# Patient Record
Sex: MALE | Race: WHITE | NOT HISPANIC OR LATINO | Employment: OTHER | URBAN - METROPOLITAN AREA
[De-identification: names, ages, dates, MRNs, and addresses within clinical notes are randomized per-mention and may not be internally consistent; named-entity substitution may affect disease eponyms.]

---

## 2019-07-09 ENCOUNTER — TELEPHONE (OUTPATIENT)
Dept: PSYCHIATRY | Facility: CLINIC | Age: 54
End: 2019-07-09

## 2019-07-09 NOTE — TELEPHONE ENCOUNTER
PSYCHIATRY CLINIC PHONE INTAKE     SERVICES REQUESTED / INTERESTED IN          Individual Psychotherapy     Presenting Problem and Brief History                              What would you like to be seen for? (brief description):  Patient has self referred for Substance Abuse Evaluation, with the potentiality for continued care. Patient has never been in a treatment setting either inpatient or outpatient. Patients DOC is alcohol, and patient has been using for 30 plus years. Patient noticed an effect on his day to day life 10 years past. Patient stated that he is a functioning alcoholic. Patient stated that he will drink beer, wine, and liquor. Patient stated he has never been to detox. Patient stated he has never attended AA. Patient stated that his wife moved out due to patients drinking, and he is attempting to become sober to regain his relationship. Patient is seeing a psychologist at [facility] for the past two months. Patient stated he is now taking Trazadone, and a sleep medication before bed, and it is helping him sleep. Patient has not yet had any bouts of alcohol since his wife left.          Have you received a mental health diagnosis? No   Which one (s): Patient stated he was unsure.   Is there any history of developmental delay?  No   Are you currently seeing a mental health provider?  Yes            Who / month last seen:  [facility] Psychologist.  Do you have mental health records elsewhere?  Yes  Will you sign a release so we can obtain them?  Yes    Have you ever been hospitalized for psychiatric reasons?  No  Describe:  N/A    Do you have current thoughts of self-harm?  No    Do you currently have thoughts of harming others?  No       Substance Use History     Do you have any history of alcohol / illicit drug use?  Yes  Describe:  Alcohol   Have you ever received treatment for this?  Yes    Describe:  N/A     Social History     Does the patient have a guardian?  No    Name / number: N/A  Have you  had an ACT team in last 12 months?  No  Describe: N/A   Do you have any current or past legal issues?  No  Describe: N/A   OK to leave a detailed voicemail?  Yes    Medical/ Surgical History                                 There is no problem list on file for this patient.         Medications             No current outpatient medications on file.     Trazadone    Sleep Medication    DISPOSITION      Patient has been added to CDE Wait list.      Kory Lopez.

## 2019-07-31 ENCOUNTER — OFFICE VISIT (OUTPATIENT)
Dept: PSYCHIATRY | Facility: CLINIC | Age: 54
End: 2019-07-31
Attending: PSYCHIATRY & NEUROLOGY
Payer: COMMERCIAL

## 2019-07-31 VITALS — WEIGHT: 162 LBS | SYSTOLIC BLOOD PRESSURE: 150 MMHG | DIASTOLIC BLOOD PRESSURE: 92 MMHG | HEART RATE: 92 BPM

## 2019-07-31 DIAGNOSIS — F10.20 ALCOHOL USE DISORDER, SEVERE, DEPENDENCE (H): Primary | ICD-10-CM

## 2019-07-31 LAB
ALBUMIN SERPL-MCNC: 4.2 G/DL (ref 3.4–5)
ALP SERPL-CCNC: 41 U/L (ref 40–150)
ALT SERPL W P-5'-P-CCNC: 153 U/L (ref 0–70)
ANION GAP SERPL CALCULATED.3IONS-SCNC: 11 MMOL/L (ref 3–14)
AST SERPL W P-5'-P-CCNC: 88 U/L (ref 0–45)
BILIRUB SERPL-MCNC: 0.3 MG/DL (ref 0.2–1.3)
BUN SERPL-MCNC: 12 MG/DL (ref 7–30)
CALCIUM SERPL-MCNC: 8.9 MG/DL (ref 8.5–10.1)
CHLORIDE SERPL-SCNC: 102 MMOL/L (ref 94–109)
CO2 SERPL-SCNC: 25 MMOL/L (ref 20–32)
CREAT SERPL-MCNC: 0.7 MG/DL (ref 0.66–1.25)
ERYTHROCYTE [DISTWIDTH] IN BLOOD BY AUTOMATED COUNT: 13.4 % (ref 10–15)
GFR SERPL CREATININE-BSD FRML MDRD: >90 ML/MIN/{1.73_M2}
GLUCOSE SERPL-MCNC: 88 MG/DL (ref 70–99)
HCT VFR BLD AUTO: 39.5 % (ref 40–53)
HGB BLD-MCNC: 13.3 G/DL (ref 13.3–17.7)
MCH RBC QN AUTO: 33.8 PG (ref 26.5–33)
MCHC RBC AUTO-ENTMCNC: 33.7 G/DL (ref 31.5–36.5)
MCV RBC AUTO: 101 FL (ref 78–100)
PLATELET # BLD AUTO: 215 10E9/L (ref 150–450)
POTASSIUM SERPL-SCNC: 3.7 MMOL/L (ref 3.4–5.3)
PROT SERPL-MCNC: 7.6 G/DL (ref 6.8–8.8)
RBC # BLD AUTO: 3.93 10E12/L (ref 4.4–5.9)
SODIUM SERPL-SCNC: 138 MMOL/L (ref 133–144)
WBC # BLD AUTO: 6 10E9/L (ref 4–11)

## 2019-07-31 PROCEDURE — 80053 COMPREHEN METABOLIC PANEL: CPT | Performed by: PSYCHIATRY & NEUROLOGY

## 2019-07-31 PROCEDURE — G0463 HOSPITAL OUTPT CLINIC VISIT: HCPCS | Mod: ZF

## 2019-07-31 PROCEDURE — 85027 COMPLETE CBC AUTOMATED: CPT | Performed by: PSYCHIATRY & NEUROLOGY

## 2019-07-31 PROCEDURE — 36415 COLL VENOUS BLD VENIPUNCTURE: CPT | Performed by: PSYCHIATRY & NEUROLOGY

## 2019-07-31 RX ORDER — FEXOFENADINE HCL 180 MG/1
180 TABLET ORAL DAILY
COMMUNITY
Start: 2019-04-04

## 2019-07-31 RX ORDER — VALSARTAN 160 MG/1
160 TABLET ORAL
COMMUNITY
Start: 2014-07-22

## 2019-07-31 RX ORDER — FLUTICASONE PROPIONATE 110 UG/1
2 AEROSOL, METERED RESPIRATORY (INHALATION)
COMMUNITY
Start: 2019-04-04

## 2019-07-31 RX ORDER — NALTREXONE HYDROCHLORIDE 50 MG/1
50 TABLET, FILM COATED ORAL DAILY
COMMUNITY
Start: 2019-07-15

## 2019-07-31 RX ORDER — MIRTAZAPINE 15 MG/1
15 TABLET, FILM COATED ORAL AT BEDTIME
Status: ON HOLD | COMMUNITY
Start: 2019-07-05 | End: 2024-08-22

## 2019-07-31 RX ORDER — ALBUTEROL SULFATE 90 UG/1
2 AEROSOL, METERED RESPIRATORY (INHALATION)
COMMUNITY
Start: 2013-12-09

## 2019-07-31 RX ORDER — AMLODIPINE BESYLATE 5 MG/1
5 TABLET ORAL DAILY
COMMUNITY
Start: 2019-04-04

## 2019-07-31 ASSESSMENT — PAIN SCALES - GENERAL: PAINLEVEL: NO PAIN (0)

## 2019-07-31 NOTE — PROGRESS NOTES
"  ----------------------------------------------------------------------------------------------------------  [facility], [place]   Psychiatry Clinic New Patient Evaluation  Addiction Psychiatry                        IDENTIFICATION   Suleman Reece is a 53 year old male who was referred by himself for evaluation of alcohol use.  History was provided by patient who was a good historian.       CHIEF COMPLAINT         \" I need to stop drinking \"      HISTORY OF PRESENT ILLNESS     The patient has a past psychiatric/substance use history of heavy alcohol use.    MOST RECENT HISTORY Had had alcohol every day for the past 10 years. Drinks every evening before 4-9p,m between 5-7 drinks. Drinks heavily every Saturday, 10+ drinks, and drinks the typical 5-7 drinks to recover on Sunday.     Wife Sherri moved out on Memorial Day because of his drinking. He has had blackout episodes when drinking heavily. He has on occasion partied with colleagues and on at least one occasion kissed a female colleague n front of his wife. That person has become his wife's boss and subsequently his wife got a demoted.      After his wife left he started seeing a counselor and attempting to cut back on drinking. He was anxious about what he would do in July because as a  he was off of work and would not have any structure to keep him from drinking.    Had a panic attack on July 2, and subsequently he has had a few throughout the month. He has not had panic attacks before in his life. He ended up drinking 7-8 drinks each day throughout the month, though he tried to cut back. He began at age 16 and binged a couple times a month in high school. He drank less in college, but since graduation most of his socialization involved drinking. His drinking got worse around the time of his son's sudden death October 2018.    RECENT SUBSTANCE USE:     ALCOHOL-  5-10+ drink(s) per day       TOBACCO- none        " CAFFEINE- not assessed        OPIOIDS- none / NARCAN KIT-  No             CANNABIS- tried a handful of times             OTHER ILLICIT DRUGS- diet pills and denies    Brief synopsis of current Mental Health History: Denies history of mental illness. Has never sought treatment.      SUBSTANCE USE HISTORY                                                               Primary Drug Used:Alcohol.      Detailed Substance Use History:    Substance Age first use Quality/character Age max use Most recent use   Alcohol 16 Dependent, 5-10+ per day 53 yesterday   Cannabis 21 rare NA NA   Cocaine none none none none   Stimulants none none none none   Opioids none none none none   Sedatives none none none none   Hallucinogens none none none none   Inhalants none none none none   Other none none none none   OTC drugs none none none none   Nicotine Not assessed NA NA NA     Chronology of use (pattern, progression, abstinence periods):   Progressive use since the age of 16. Drinks daily. Has not had 24hrs of abstinence in at least 10 years.     Pathological Indicators (Rapid use, tolerance/withdrawal, hiding, preoccupation, impulsive use, blackouts, secretiveness, guilt, medicinal use):   Tolerance, rapid use, inability to stop, blackouts, guilt,     Relapse History (triggers, duration, interventions):   Never stopped    Abstinence periods (what helped/contributed?):  None    Consequences of Use:  Legal: None  Social: Divorce, isolation from family  Occupational: None  Health: None  Family: Wife is currently  him  Financial: Needs to sell his condo as result of divorce from wife.    Treatment History (Year, type, length, use in treatment, abstinence after treatment, aftercare):  None    Substance Use Pharmacotherapies: (History of Antabuse, Methadone, Naltrexone,Vivitrol,  Suboxone, Campral):  None      RECENT SYMPTOMS   [PSYCH ROS]     PANIC ATTACK:  peaks in < 2 mins, occurs 2x per week, triggers are not known   ANXIETY:   Unclear  DEPRESSION:  depressed mood;  DENIES- suicidal ideation, anhedonia, low energy, appetite changes and poor concentration /memory  DYSREGULATION:  none;  DENIES- SIB, aggressive, irritable and physically agitated  PSYCHOSIS:  none;  DENIES- delusions, auditory hallucinations and visual hallucinations  NOEMI/HYPOMANIA:  none;  DENIES- increased energy, decreased sleep need, increased activity and grandiosity  TRAUMA RELATED:  none  EATING DISORDER:  none  COMPULSIVE:  none outside of alcohol use  OTHER:  None     PSYCHIATRIC HISTORY     Previous diagnoses: None     Narrative Summary: Mr. Reece reports episodic depression following life events such as divorce and the death of his child. He denies persistent mood problem or other psychiatric history.    Symptoms in relation to substance use (symptoms present without use?): Panic with attempts to cut back.    Childhood behavioral problems: None reported  Past court commitments: None  SIB [method, most recent]- none  Violence/Aggression Hx- none  Eating Disorder: None    Suicidal Ideation Hx [passive, active]- none  Suicide Attempt [#, recent, method]:   #- N/A   Most Recent- N/A    Psych Hosp [ #, most recent, committed]- none  Outpatient Programs [ DBT, Day Treatment, Eating Disorder Tx etc] : None    PAST PSYCH MED TRIALS       Drug /  Start Date Dose (mg) Helpful Adverse Effects   DC Reason / Date   setraline 100mg unclear none current   mirtazepine 15mg unclear none current              SOCIAL HISTORY                                                                         Financial Support- works full time as   Living Situation/Family/Relationships- some support form mother and nephew  Trauma History (self-report)- None  Legal- None  Recovery Support- none    Early History/Education- not discussed    FAMILY HISTORY                                                                       patient reported     Family Mental Health  History-  None reported,  Substance Use Problems - brother and half-sister: AUD  Medical- none    MEDICAL / SURGICAL HISTORY                                   CARE TEAM:          [facility]                   Therapist- need to ask at next visit    Neurologic Hx:   [head injury/ LOC/ seizure/ other]-   None  Patient Active Problem List   Diagnosis     Alcohol use disorder, severe, dependence (H)     Hypertension     Other Medical Hx: Hypertension, erectile dysfunction    MEDICAL ROS:    ROS: 10 point ROS neg other than the symptoms noted above in the HPI.       ALLERGY                                Mold  MEDICATIONS                               Current Outpatient Medications   Medication Sig Dispense Refill     albuterol (PROAIR HFA/PROVENTIL HFA/VENTOLIN HFA) 108 (90 Base) MCG/ACT inhaler Inhale 2 puffs into the lungs       amLODIPine (NORVASC) 5 MG tablet 5 mg by Oral or Feeding Tube route daily       fexofenadine (ALLEGRA) 180 MG tablet 180 mg by Oral or Feeding Tube route daily       fluticasone (FLOVENT HFA) 110 MCG/ACT inhaler Inhale 2 puffs into the lungs       valsartan (DIOVAN) 160 MG tablet Take 160 mg by mouth       mirtazapine (REMERON) 15 MG tablet 15 mg by Oral or Feeding Tube route At Bedtime       naltrexone (DEPADE/REVIA) 50 MG tablet 50 mg by Oral or Feeding Tube route daily       sertraline (ZOLOFT) 50 MG tablet 100 mg by Oral or Feeding Tube route daily         VITALS   BP (!) 150/92   Pulse 92   Wt 73.5 kg (162 lb)    MENTAL STATUS EXAM                                                           Orientation: full, x3  Alertness: alert   Appearance: well groomed  Behavior/Demeanor: cooperative and pleasant, with good  eye contact   Speech: normal and regular rate and rhythm  Language: no problems  Psychomotor: normal or unremarkable  Mood: depressed  Affect: appropriate; was congruent to mood; was congruent to content  Thought Process/Associations: unremarkable  Thought Content:   Reports none;  Denies suicidal ideation  Perception:  Reports none;  Denies auditory hallucinations and visual hallucinations  Insight: fair  Judgment: fair  Cognition: does  appear grossly intact; formal cognitive testing was not done  Gait: Normal   MSK: extremities normal, no peripheral edema    LABS and DATA     Recent Labs   Lab Test 07/31/19  1655   CR 0.70   GFRESTIMATED >90     Recent Labs   Lab Test 07/31/19  1655   AST 88*   *   ALKPHOS 41         PHQ9 TODAY = 12  No flowsheet data found.       SUBSTANCE USE/PSYCHIATRIC DIAGNOSES                                                                                                    Alcohol Use Disorder, severe       ASSESSMENT                                           See 'Plan' section for specific dosing info             This patient is a 53 year old male who presents for treatment of alcohol use and is found to have Alcohol Use Disorder - Severe, with the possibility of underlying mood disorder.  He endorces panic attacks but these are of recent onset.     #AUD: Patient was encouraged to enter detox and residential treatment but has thus far declined. He was informed his use is risky and that he has not been able to control it. We discussed risks of attempting to stop on his own: withdrawal seizures, etc.  We recommended to stop as soon as possible in a supervised environment and enter treatment.. Patient preferred to cut down on his own at home. He agreed to drink no more than 4 drinks per day and follow-up in clinic in 1 week. If he is not able to stop drinking on his own then it would be a clear indication to him that he was not able to control his use and support the need for more intensive treatment.      #Mild liver inflammation: continue to monitor with serial labs    Further psychiatric diagnostic clarification is needed as he is on medications for anxiety, depression but he does not clearly meet criteria for an independent psychiatric  disorder.  There are no medical comorbidities which impact this treatment.    [place] PRESCRIPTION MONITORING PROGRAM [] was not checked today:  NA.     PLAN                                                                                                       1) PSYCHOTROPIC MEDICATIONS:   - continue sertraline 100mg started by PCP   - continue mirtazepine started by PCP   - continue naltrexone started by PCP    2) THERAPY:  Continue      TREATMENT PLAN: Date revised  -  Not completed                                          Date next due- Next visit2    4) REFERRALS:    Patient provided phone number for [facility]    5) RTC: 1 week      TREATMENT RISK STATEMENT:  The risks, benefits, alternatives and potential adverse effects have been explained and are understood by the pt. The pt agrees to the treatment plan with the ability to do so. The pt knows to call the clinic for any problems or to access emergency care if needed.  Medical and CD concerns are documented above.  Psychotropic drug interaction check was done, including changes made today, and is discussed above.    ADDICTION FELLOW: Heriberto Dev Sundar    Patient was staffed in clinic with Dr. Meyers who will sign the note.      I saw the patient with the fellow, and participated in key portions of the service, including the mental status examination and developing the plan of care. I reviewed key portions of the history with the fellow. I agree with the findings and plan as documented in this note.    Ana Meyers

## 2019-08-02 ASSESSMENT — PATIENT HEALTH QUESTIONNAIRE - PHQ9: SUM OF ALL RESPONSES TO PHQ QUESTIONS 1-9: 12

## 2019-08-07 ENCOUNTER — OFFICE VISIT (OUTPATIENT)
Dept: PSYCHIATRY | Facility: CLINIC | Age: 54
End: 2019-08-07
Attending: PSYCHIATRY & NEUROLOGY
Payer: COMMERCIAL

## 2019-08-07 VITALS — DIASTOLIC BLOOD PRESSURE: 94 MMHG | SYSTOLIC BLOOD PRESSURE: 138 MMHG | WEIGHT: 159 LBS | HEART RATE: 98 BPM

## 2019-08-07 DIAGNOSIS — F10.20 ALCOHOL USE DISORDER, SEVERE, DEPENDENCE (H): Primary | ICD-10-CM

## 2019-08-07 PROCEDURE — G0463 HOSPITAL OUTPT CLINIC VISIT: HCPCS | Mod: ZF

## 2019-08-07 RX ORDER — TRAZODONE HYDROCHLORIDE 100 MG/1
100 TABLET ORAL
COMMUNITY
Start: 2019-04-04

## 2019-08-07 ASSESSMENT — PAIN SCALES - GENERAL: PAINLEVEL: NO PAIN (0)

## 2019-08-07 ASSESSMENT — PATIENT HEALTH QUESTIONNAIRE - PHQ9: SUM OF ALL RESPONSES TO PHQ QUESTIONS 1-9: 3

## 2019-08-07 NOTE — NURSING NOTE
Chief Complaint   Patient presents with     Recheck Medication     Alcohol use disorder, severe, dependence

## 2019-08-07 NOTE — PROGRESS NOTES
"  ----------------------------------------------------------------------------------------------------------  [facility], [place]   Psychiatry Clinic Progress Note  Addiction Medicine/Psychiatry                      IDENTIFICATION:  Suleman Reece is a 53 year old male with alcohol use disorder - severe.   Pt presents for ongoing psychiatric/substance use follow-up. Patient was seen for initial evaluation on 7/31/2019.  SUBJECTIVE / INTERIM HISTORY                                                 The pt was last seen in clinic 7/31/19 at which time he was encouraged to cut back on drinking to 4 beers per day and to consider treatment programs. The patient did make the change(s). The patient reports good medication adherence.   There was no concern for pt safety at the time of the last visit.     Interim history:  Mr. Reece had 3 drinks the day after he left the clinic. He had 5 the day after and then 4 each day. He had 1-2 non-alcoholic beers each day, which he thought helped him stop having additional beers. He had to stay with a friend for 4 days while wife stayed in their condo and he drank with that friend but also at the bar many evenings. He told many friends that he \"is going to stop drinking,\" but he did not have any conversations about what that decision would mean.   He reflects again that everyone he knows drinks when they are together. Many of his friends are people he knows from being at the bar. He is concerned that he doesn't have any other friends to be with or know where to go not to be around drinking.  He does feel proud of his ability to cut back and is feeling better generally. His mood is much improved from last week. It has been tough to get  and list their condo but not that it is done he feels that he is moving on.    Current Substance Use- Alcohol.   Last use of substance(s): 8/5/19 - yesterday. Having 4 drinks per day.  Current recovery activities: Plan " is to begin outpatient treatment through [facility].    SYMPTOMS- mild hand tremor. No dizziness, nausea, vomiting, confusion..  MEDICAL ROS- Denies chest pain, SOB.    RELEVANT SOCIAL/FAMILY                                                     Patient  Reported     Financial Support- works full time as   Living Situation/Family/Relationships- some support form mother and nephew. Son had sudden death at age 26.    PSYCHIATRIC and SUBSTANCE USE HISTORY      Previous diagnoses: None      Narrative Summary: Mr. Reece reports episodic depression following life events such as divorce and the death of his child. He denies persistent mood problem or other psychiatric history.     Symptoms in relation to substance use: Panic with attempts to cut back.     Childhood behavioral problems: None reported  Past court commitments: None  SIB [method, most recent]- none  Violence/Aggression Hx- none  Eating Disorder: None     Suicidal Ideation Hx [passive, active]- none  Suicide Attempt [#, recent, method]:   #- N/A   Most Recent- N/A     Psych Hosp [ #, most recent, committed]- none  Outpatient Programs [ DBT, Day Treatment, Eating Disorder Tx etc] : None      SUBSTANCE USE HISTORY SYNOPSIS:  Past substances- alcohol  Treatment- [#, most recent] none.  Medical consequences- [withdrawal, sz] none.  HIV/Hepatitis- none.  Legal consequences- none.    PAST MEDICATION TRIALS       Drug /  Start Date Dose (mg) Helpful Adverse Effects    DC Reason / Date   setraline 100mg yes none current   mirtazepine 15mg unclear none current    trazodone  100mg yes  none current      UPDATED MEDICAL / SURGICAL HISTORY                pregnant [if applicable]--No     Medical problems: Hypertension    Medical Team:     [facility]                      ALLERGY   Mold  MEDICATIONS                                                                       bold meds Rx     Current Outpatient Medications   Medication Sig      albuterol (PROAIR HFA/PROVENTIL HFA/VENTOLIN HFA) 108 (90 Base) MCG/ACT inhaler Inhale 2 puffs into the lungs     amLODIPine (NORVASC) 5 MG tablet 5 mg by Oral or Feeding Tube route daily     fexofenadine (ALLEGRA) 180 MG tablet 180 mg by Oral or Feeding Tube route daily     fluticasone (FLOVENT HFA) 110 MCG/ACT inhaler Inhale 2 puffs into the lungs     mirtazapine (REMERON) 15 MG tablet 15 mg by Oral or Feeding Tube route At Bedtime     naltrexone (DEPADE/REVIA) 50 MG tablet 50 mg by Oral or Feeding Tube route daily     sertraline (ZOLOFT) 50 MG tablet 100 mg by Oral or Feeding Tube route daily     traZODone (DESYREL) 100 MG tablet Take 100 mg by mouth nightly as needed     valsartan (DIOVAN) 160 MG tablet Take 160 mg by mouth     No current facility-administered medications for this visit.      PSYCHOTROPIC DRUG INTERACTION CHECK was unremarkable   [details below if applicable]  VITALS   BP (!) 138/94   Pulse 98   Wt 72.1 kg (159 lb)    PHQ9           TODAY = 3             PHQ-9 SCORE 7/31/2019 8/7/2019   PHQ-9 Total Score 12 3     LABS                                                                                                   Office Visit on 07/31/2019   Component Date Value Ref Range Status     WBC 07/31/2019 6.0  4.0 - 11.0 10e9/L Final     RBC Count 07/31/2019 3.93* 4.4 - 5.9 10e12/L Final     Hemoglobin 07/31/2019 13.3  13.3 - 17.7 g/dL Final     Hematocrit 07/31/2019 39.5* 40.0 - 53.0 % Final     MCV 07/31/2019 101* 78 - 100 fl Final     MCH 07/31/2019 33.8* 26.5 - 33.0 pg Final     MCHC 07/31/2019 33.7  31.5 - 36.5 g/dL Final     RDW 07/31/2019 13.4  10.0 - 15.0 % Final     Platelet Count 07/31/2019 215  150 - 450 10e9/L Final     Sodium 07/31/2019 138  133 - 144 mmol/L Final     Potassium 07/31/2019 3.7  3.4 - 5.3 mmol/L Final     Chloride 07/31/2019 102  94 - 109 mmol/L Final     Carbon Dioxide 07/31/2019 25  20 - 32 mmol/L Final     Anion Gap 07/31/2019 11  3 - 14 mmol/L Final     Glucose  07/31/2019 88  70 - 99 mg/dL Final     Urea Nitrogen 07/31/2019 12  7 - 30 mg/dL Final     Creatinine 07/31/2019 0.70  0.66 - 1.25 mg/dL Final     GFR Estimate 07/31/2019 >90  >60 mL/min/[1.73_m2] Final    Comment: Non  GFR Calc  Starting 12/18/2018, serum creatinine based estimated GFR (eGFR) will be   calculated using the Chronic Kidney Disease Epidemiology Collaboration   (CKD-EPI) equation.       GFR Estimate If Black 07/31/2019 >90  >60 mL/min/[1.73_m2] Final    Comment:  GFR Calc  Starting 12/18/2018, serum creatinine based estimated GFR (eGFR) will be   calculated using the Chronic Kidney Disease Epidemiology Collaboration   (CKD-EPI) equation.       Calcium 07/31/2019 8.9  8.5 - 10.1 mg/dL Final     Bilirubin Total 07/31/2019 0.3  0.2 - 1.3 mg/dL Final     Albumin 07/31/2019 4.2  3.4 - 5.0 g/dL Final     Protein Total 07/31/2019 7.6  6.8 - 8.8 g/dL Final     Alkaline Phosphatase 07/31/2019 41  40 - 150 U/L Final     ALT 07/31/2019 153* 0 - 70 U/L Final     AST 07/31/2019 88* 0 - 45 U/L Final       MENTAL STATUS EXAM                                                              Alertness: alert   Orientation: full, x3  Alertness: alert   Appearance: well groomed  Behavior/Demeanor: cooperative and pleasant, with good  eye contact   Speech: normal and regular rate and rhythm  Language: no problems  Psychomotor: normal or unremarkable  Mood: good  Affect: appropriate; was congruent to mood; was congruent to content  Thought Process/Associations: unremarkable  Thought Content:  Reports none;  Denies suicidal ideation  Perception:  Reports none;  Denies auditory hallucinations and visual hallucinations  Insight: fair  Judgment: fair  Cognition: does  appear grossly intact; formal cognitive testing was not done  Gait: Normal   MSK: extremities normal, no peripheral edema      These cognitive functions grossly appear as described, but were not formally tested.    ASSESSMENT                                                                             CURRENT:   This patient is a 53 year old male who presents for treatment of alcohol use and is found to have Alcohol Use Disorder - Severe, with the possibility of underlying mood disorder.  He endorces panic attacks but these are of recent onset.      #AUD: Patient had cut back on his drinking to 4 drinks a day as agreed, and had no reported withdrawal symtpoms. He was again encouraged to enter residential in intensive treatment but again declined. He was informed his use is risky and that he has not been able to control it. At this time, we recommended that the patient cut to 2 drinks a day for the next week. Then 1 drink a day for a week and then stop. He is to seek medical attention for any symptoms of withdrawal such as tremor or confusion.     He is also to find outpatient treatment. He was provided contact for [facility] outpatient resources.      #Mild liver inflammation: recheck at next visit     Further psychiatric diagnostic clarification is needed as he is on medications for anxiety, depression but he does not clearly meet criteria for an independent psychiatric disorder.  There are no medical comorbidities which impact this treatment    There will not be medication changes today. See discussion below.        [place] PRESCRIPTION MONITORING PROGRAM [] was not checked today.        TREATMENT RISK STATEMENT: The risks, benefits, alternatives and potential adverse effects have been explained and are understood by the pt. The pt agrees to the treatment plan with the ability to do so. Discussion of specific concerns included- none. The pt knows to call the clinic for any problems or access emergency care if needed. There are no medical considerations relevant to treatment, as noted above. Substance use is a problem as noted above. Drug interaction check for meds prior to visit is in the MEDICATIONS section.       DIAGNOSES         Psychiatric: Mood  Disorder NOS    Substance Use: Alcohol Use Disorder - Severe    Medical: Hypertension    Psychosocial Stressors include: Recent Divorce, selling house, friends all drink    PLAN                                                                                                              1) PSYCHOTROPIC MEDICATIONS:               - continue sertraline 100mg every day started by PCP               - continue mirtazepine 15mg QHS started by PCP    - continue trazodone 100mg at bedtime by PCP               - continue naltrexone 50mg every day started by PCP    - stop buspirone started by PCP      2)  THERAPY: None at this time.    3)  LABS: None today    4)  REFERRALS [IAN, medical, other]: Provided resources for outpatient treatment at [facility]    5)  : None    6)  Controlled Substance Contract was not completed    7)  RTC: 6 weeks with Dr. Kwok      Addiction Medicine Fellow: Heriberto Kwok    Patient seen and discussed with staff psychiatrist, Dr. Meyers. Supervisor is Dr. Meyers     I saw the patient with the fellow, and participated in key portions of the service, including the mental status examination and developing the plan of care. I reviewed key portions of the history with the fellow. I agree with the findings and plan as documented in this note.    Ana Meyers

## 2019-08-07 NOTE — PATIENT INSTRUCTIONS
The phone number for the outpatient substance treatment resources at [facility] [phone]      Website:  [email or web address]    Thank you for coming to the PSYCHIATRY CLINIC.    Lab Testing:  If you had lab testing today and your results are reassuring or normal they will be mailed to you or sent through [facility] within 7 days.   If the lab tests need quick action we will call you with the results.  The phone number we will call with results is # [phone] (home) . If this is not the best number please call our clinic and change the number.    Medication Refills:  If you need any refills please call your pharmacy and they will contact us. Our fax number for refills is [phone]. Please allow three business for refill processing.   If you need to  your refill at a new pharmacy, please contact the new pharmacy directly. The new pharmacy will help you get your medications transferred.     Scheduling:  If you have any concerns about today's visit or wish to schedule another appointment please call our office during normal business hours [phone] (8-5:00 M-F)    Contact Us:  Please call [phone] during business hours (8-5:00 M-F).  If after clinic hours, or on the weekend, please call  [phone].    Financial Assistance [phone]  [facility] Billing [phone]  Central Billing Office, [facility]: [phone]  [facility] Billing [phone]  Medical Records [phone]      MENTAL HEALTH CRISIS NUMBERS:  [facility]:   [facility] - [phone]   Crisis Residence [facility] - [phone]   Walk-In Counseling [facility] - [phone]   COPE 24/7 [facility] Mobile Team for Adults - [[phone]]; Child - [[phone]]        [facility]:   [facility] - [phone]   Walk-in counseling [facility] - [phone]    Walk-in counseling [facility] - [phone]   Crisis Residence [facility] - [phone]   Urgent Care Adult Mental Health:   --Drop-in, 24/7 crisis line, [facility] Mobile Team [[phone]]    CRISIS TEXT LINE: Text [phone] from anywhere, anytime, any crisis 24/7;    OR SEE [email or web address]     Poison Control Center - [phone]    CHILD: [facility] needs assessment team - [phone]     [facility] - [phone]; or [facility] - [phone]    If you have a medical emergency please call 911or go to the nearest ER.                    _____________________________________________    Again thank you for choosing PSYCHIATRY CLINIC and please let us know how we can best partner with you to improve you and your family's health.  You may be receiving a survey in the mail regarding this appointment. We would love to have your feedback, both positive and negative, so please fill out the survey and return it using the provided envelope. The survey is done by an external company, so your answers are anonymous.

## 2019-10-02 ENCOUNTER — HEALTH MAINTENANCE LETTER (OUTPATIENT)
Age: 54
End: 2019-10-02

## 2019-10-16 ENCOUNTER — OFFICE VISIT (OUTPATIENT)
Dept: PSYCHIATRY | Facility: CLINIC | Age: 54
End: 2019-10-16
Attending: PSYCHIATRY & NEUROLOGY
Payer: COMMERCIAL

## 2019-10-16 DIAGNOSIS — F10.20 ALCOHOL USE DISORDER, SEVERE, DEPENDENCE (H): Primary | ICD-10-CM

## 2019-10-16 NOTE — PROGRESS NOTES
"  ----------------------------------------------------------------------------------------------------------  [facility], [place]   Psychiatry Clinic Progress Note  Addiction Medicine/Psychiatry                      IDENTIFICATION:  Suleman Reece is a 53 year old male with alcohol use disorder - severe.   Pt presents for ongoing psychiatric/substance use follow-up. Patient was seen for initial evaluation on 7/31/2019.  SUBJECTIVE / INTERIM HISTORY                                                 The pt was last seen in clinic 8/07/19 at which time he was encouraged to cut back on drinking to 2 beers per day and to consider treatment programs. The patient did not make the change(s).   There was no concern for pt safety at the time of the last visit.     Mr. Reece first presented to our clinic in July 2019 asking for help to reduce and or quit his drinking. At that time hew as drinking 5-10 drinks per day and had recently been asked for a divorce by his wife. His drinking had lost him his most important relationship and put him in difficulty financial situation. He maintained that he could reduce his drinking on his own and remained ambivalent about stopping since all his relationships involved alcohol.   Interim history:   Mr. Reece continues to drink heavily. He has lost the gains he made in reducing his consumption and is again drinking 5-6 drinks per night with larger binges on some weekends. Two days after he left his last visit on a Friday he received a lot of divorce papers and had a binge of 8-10 drinks and then established the pattern he had before reductions. He goes to the bar each day after school and has at least 2 drinks. Then he has at least 2 more at home. In order not to drink more he drinks \"NA\" beer, drinking these both at home and at the bar.     He is stressed and increasingly depressed lately about \"being potentially homeless\", continuing to share finances with his " wife, [place]. Further questioning shows that he still owns his home and if he cannot sell it soon would need to find a roommate. His dad had open heart surgery, and he is unable to rely on his wife who has been his only real social support. Often he does not want to get out of bed in the morning, but he gets up anyway to take his dog out and then he is off on his day.     He remains resistant to inpatient treatment and ambivalent about stopping drinking. When asked for goals he states he wants to cut down to 2 drinks per day.     He is only taking mirtazepine as needed, which amounts to less than once a week.   Current Substance Use- Alcohol.   Last use of substance(s): 10/15/19 - yesterday. Having 5-6 drinks per day.  Current recovery activities: Plan is to begin outpatient treatment through [place].    SYMPTOMS- mild hand tremor. No dizziness, nausea, vomiting, confusion..  MEDICAL ROS- Denies chest pain, SOB.    RELEVANT SOCIAL/FAMILY                                                     Patient  Reported     Financial Support- works full time as   Living Situation/Family/Relationships- some support form mother and nephew. Son had sudden death at age 26.    PSYCHIATRIC and SUBSTANCE USE HISTORY      Previous diagnoses: None      Narrative Summary: Mr. Reece reports episodic depression following life events such as divorce and the death of his child. He denies persistent mood problem or other psychiatric history.     Symptoms in relation to substance use: Panic with attempts to cut back.     Childhood behavioral problems: None reported  Past court commitments: None  SIB [method, most recent]- none  Violence/Aggression Hx- none  Eating Disorder: None     Suicidal Ideation Hx [passive, active]- none  Suicide Attempt [#, recent, method]:   #- N/A   Most Recent- N/A     Psych Hosp [ #, most recent, committed]- none  Outpatient Programs [ DBT, Day Treatment, Eating  Disorder Tx etc] : None      SUBSTANCE USE HISTORY SYNOPSIS:  Past substances- alcohol  Treatment- [#, most recent] none.  Medical consequences- [withdrawal, sz] none.  HIV/Hepatitis- none.  Legal consequences- none.    PAST MEDICATION TRIALS       Drug /  Start Date Dose (mg) Helpful Adverse Effects    DC Reason / Date   setraline 100mg yes none current   mirtazepine 15mg unclear none current    trazodone  100mg yes  none current      UPDATED MEDICAL / SURGICAL HISTORY                pregnant [if applicable]--No     Medical problems: Hypertension    Medical Team:     [facility]                      ALLERGY   Mold  MEDICATIONS                                                                       bold meds Rx     Current Outpatient Medications   Medication Sig     albuterol (PROAIR HFA/PROVENTIL HFA/VENTOLIN HFA) 108 (90 Base) MCG/ACT inhaler Inhale 2 puffs into the lungs     amLODIPine (NORVASC) 5 MG tablet 5 mg by Oral or Feeding Tube route daily     fexofenadine (ALLEGRA) 180 MG tablet 180 mg by Oral or Feeding Tube route daily     fluticasone (FLOVENT HFA) 110 MCG/ACT inhaler Inhale 2 puffs into the lungs     mirtazapine (REMERON) 15 MG tablet 15 mg by Oral or Feeding Tube route At Bedtime     naltrexone (DEPADE/REVIA) 50 MG tablet 50 mg by Oral or Feeding Tube route daily     sertraline (ZOLOFT) 50 MG tablet 100 mg by Oral or Feeding Tube route daily     traZODone (DESYREL) 100 MG tablet Take 100 mg by mouth nightly as needed     valsartan (DIOVAN) 160 MG tablet Take 160 mg by mouth     No current facility-administered medications for this visit.      PSYCHOTROPIC DRUG INTERACTION CHECK was unremarkable   [details below if applicable]  VITALS   There were no vitals taken for this visit.   PHQ9           TODAY = 7             PHQ-9 SCORE 7/31/2019 8/7/2019   PHQ-9 Total Score 12 3     LABS                                                                                                   Office Visit on  07/31/2019   Component Date Value Ref Range Status     WBC 07/31/2019 6.0  4.0 - 11.0 10e9/L Final     RBC Count 07/31/2019 3.93* 4.4 - 5.9 10e12/L Final     Hemoglobin 07/31/2019 13.3  13.3 - 17.7 g/dL Final     Hematocrit 07/31/2019 39.5* 40.0 - 53.0 % Final     MCV 07/31/2019 101* 78 - 100 fl Final     MCH 07/31/2019 33.8* 26.5 - 33.0 pg Final     MCHC 07/31/2019 33.7  31.5 - 36.5 g/dL Final     RDW 07/31/2019 13.4  10.0 - 15.0 % Final     Platelet Count 07/31/2019 215  150 - 450 10e9/L Final     Sodium 07/31/2019 138  133 - 144 mmol/L Final     Potassium 07/31/2019 3.7  3.4 - 5.3 mmol/L Final     Chloride 07/31/2019 102  94 - 109 mmol/L Final     Carbon Dioxide 07/31/2019 25  20 - 32 mmol/L Final     Anion Gap 07/31/2019 11  3 - 14 mmol/L Final     Glucose 07/31/2019 88  70 - 99 mg/dL Final     Urea Nitrogen 07/31/2019 12  7 - 30 mg/dL Final     Creatinine 07/31/2019 0.70  0.66 - 1.25 mg/dL Final     GFR Estimate 07/31/2019 >90  >60 mL/min/[1.73_m2] Final    Comment: Non  GFR Calc  Starting 12/18/2018, serum creatinine based estimated GFR (eGFR) will be   calculated using the Chronic Kidney Disease Epidemiology Collaboration   (CKD-EPI) equation.       GFR Estimate If Black 07/31/2019 >90  >60 mL/min/[1.73_m2] Final    Comment:  GFR Calc  Starting 12/18/2018, serum creatinine based estimated GFR (eGFR) will be   calculated using the Chronic Kidney Disease Epidemiology Collaboration   (CKD-EPI) equation.       Calcium 07/31/2019 8.9  8.5 - 10.1 mg/dL Final     Bilirubin Total 07/31/2019 0.3  0.2 - 1.3 mg/dL Final     Albumin 07/31/2019 4.2  3.4 - 5.0 g/dL Final     Protein Total 07/31/2019 7.6  6.8 - 8.8 g/dL Final     Alkaline Phosphatase 07/31/2019 41  40 - 150 U/L Final     ALT 07/31/2019 153* 0 - 70 U/L Final     AST 07/31/2019 88* 0 - 45 U/L Final       MENTAL STATUS EXAM                                                              Alertness: alert   Orientation: full,  x3  Alertness: alert   Appearance: neatly groomed  Behavior/Demeanor: cooperative and pleasant, with good  eye contact   Speech: normal and regular rate and rhythm  Language: no problems  Psychomotor: normal or unremarkable  Mood: good  Affect: appropriate; was congruent to mood; was congruent to content  Thought Process/Associations: unremarkable  Thought Content:  Reports none;  Denies suicidal ideation  Perception:  Reports none;  Denies auditory hallucinations and visual hallucinations  Insight: fair  Judgment: fair  Cognition: does  appear grossly intact; formal cognitive testing was not done  Gait: Normal   MSK: extremities normal, no peripheral edema      These cognitive functions grossly appear as described, but were not formally tested.    ASSESSMENT                                                                            CURRENT:   This patient is a 53 year old male who presents for treatment of alcohol use and is found to have Alcohol Use Disorder - Severe, with the possibility of underlying mood disorder.  He endorces panic attacks but these are of recent onset.      #AUD: Patient relapsed to his previous heavy drinking of 6+ drinks per day after having briefly succeed in efforts to cut down. He has yet to have withdrawal symtpoms. He was again encouraged to enter residential in intensive treatment but again declined. His goal is to cut down to 2 drinks per day. He did agree to look into outpatient treatment programs and was provided with local numbers to call, though he agreed to a similar plan previously. He is to seek medical attention for any symptoms of withdrawal such as tremor or confusion.     #Depressed Mood: As before Mr. Reece low mood seems to follow life stresses more closely than to be a primary problem. Will continue sertraline 100mg daily and encourage alcohol cessation.     #Mild liver inflammation: recheck at future visit     Further psychiatric diagnostic clarification is needed as  he is on medications for anxiety, depression but he does not clearly meet criteria for an independent psychiatric disorder.  There are no medical comorbidities which impact this treatment    There will not be medication changes today. See discussion below.      [place] PRESCRIPTION MONITORING PROGRAM [] was not checked today.      TREATMENT RISK STATEMENT: The risks, benefits, alternatives and potential adverse effects have been explained and are understood by the pt. The pt agrees to the treatment plan with the ability to do so. Discussion of specific concerns included- none. The pt knows to call the clinic for any problems or access emergency care if needed. There are no medical considerations relevant to treatment, as noted above. Substance use is a problem as noted above. Drug interaction check for meds prior to visit is in the MEDICATIONS section.       DIAGNOSES         Psychiatric: Mood Disorder NOS    Substance Use: Alcohol Use Disorder - Severe    Medical: Hypertension    Psychosocial Stressors include: Recent Divorce, selling house, friends all drink    PLAN                                                                                                              1) PSYCHOTROPIC MEDICATIONS:               - continue sertraline 100mg every day started by PCP               - told to stop mirtazepine 15mg QHS started by PCP    - continue trazodone 100mg at bedtime by PCP               - continue naltrexone 50mg every day started by PCP    - stop buspirone started by PCP      2)  THERAPY: MI during visit. Encouraged to seek treatment.    3)  LABS: None today    4)  REFERRALS [IAN, medical, other]: Provided resources for outpatient treatment at [place]    5)  : None    6)  Controlled Substance Contract was not completed    7)  RTC: 6 weeks with Dr. Kwok      Addiction Medicine Fellow: Heriberto Kwok    Patient seen and discussed with staff psychiatrist, Dr. Meyers. Supervisor is   Mira     I saw the patient with the fellow, and participated in key portions of the service, including the mental status examination and developing the plan of care. I reviewed key portions of the history with the fellow. I agree with the findings and plan as documented in this note.    Ana Meyers MD

## 2019-10-17 ASSESSMENT — PATIENT HEALTH QUESTIONNAIRE - PHQ9: SUM OF ALL RESPONSES TO PHQ QUESTIONS 1-9: 7

## 2021-01-15 ENCOUNTER — HEALTH MAINTENANCE LETTER (OUTPATIENT)
Age: 56
End: 2021-01-15

## 2021-09-04 ENCOUNTER — HEALTH MAINTENANCE LETTER (OUTPATIENT)
Age: 56
End: 2021-09-04

## 2021-12-03 ENCOUNTER — TELEPHONE (OUTPATIENT)
Dept: PSYCHIATRY | Facility: CLINIC | Age: 56
End: 2021-12-03
Payer: COMMERCIAL

## 2021-12-03 NOTE — TELEPHONE ENCOUNTER
"PSYCHIATRY CLINIC PHONE INTAKE     SERVICES REQUESTED / INTERESTED IN          Med Management    Presenting Problem and Brief History                              What would you like to be seen for? (brief description):    Pt got sent home from work recently because of alcohol consumption. Pt has just been following with primary care since last seen in clinic.    Have you received a mental health diagnosis? Yes   Which one (s): anxiety, alcoholism  Is there any history of developmental delay?  No   Are you currently seeing a mental health provider?  No            Who / month last seen:  Last seen in this clinic in 2019 and by PCP  Do you have mental health records elsewhere?  Yes - [facility]  Will you sign a release so we can obtain them?  Yes    Have you ever been hospitalized for psychiatric reasons?  No  Describe:  na    Do you have current thoughts of self-harm?  Yes    Do you currently have thoughts of harming others?  No       Substance Use History     Do you have any history of alcohol / illicit drug use?  Yes  Describe:  Alcohol, smokes \"a little bit of weed\"  Have you ever received treatment for this?  Yes    Describe:  Just with this clinic in 2019     Social History     Who is the patient's a guardian?  self    Name / number: self  Have you had an ACT team in last 12 months?  No  Describe: na   OK to leave a detailed voicemail?  Yes        Medical/ Surgical History                                   Patient Active Problem List   Diagnosis     Alcohol use disorder, severe, dependence (H)     Hypertension          Medications             Current Outpatient Medications   Medication Sig Dispense Refill     albuterol (PROAIR HFA/PROVENTIL HFA/VENTOLIN HFA) 108 (90 Base) MCG/ACT inhaler Inhale 2 puffs into the lungs       amLODIPine (NORVASC) 5 MG tablet 5 mg by Oral or Feeding Tube route daily       fexofenadine (ALLEGRA) 180 MG tablet 180 mg by Oral or Feeding Tube route daily       fluticasone (FLOVENT " HFA) 110 MCG/ACT inhaler Inhale 2 puffs into the lungs       mirtazapine (REMERON) 15 MG tablet 15 mg by Oral or Feeding Tube route At Bedtime       naltrexone (DEPADE/REVIA) 50 MG tablet 50 mg by Oral or Feeding Tube route daily       sertraline (ZOLOFT) 50 MG tablet 100 mg by Oral or Feeding Tube route daily       traZODone (DESYREL) 100 MG tablet Take 100 mg by mouth nightly as needed       valsartan (DIOVAN) 160 MG tablet Take 160 mg by mouth           DISPOSITION      Phone screen completed with patient and schedule for CDE with fellow supervised by Dr Meyers     Leah Ng

## 2022-02-19 ENCOUNTER — HEALTH MAINTENANCE LETTER (OUTPATIENT)
Age: 57
End: 2022-02-19

## 2022-10-22 ENCOUNTER — HEALTH MAINTENANCE LETTER (OUTPATIENT)
Age: 57
End: 2022-10-22

## 2023-04-01 ENCOUNTER — HEALTH MAINTENANCE LETTER (OUTPATIENT)
Age: 58
End: 2023-04-01

## 2024-06-02 ENCOUNTER — HEALTH MAINTENANCE LETTER (OUTPATIENT)
Age: 59
End: 2024-06-02

## 2024-08-19 RX ORDER — GABAPENTIN 100 MG/1
100 CAPSULE ORAL 3 TIMES DAILY
Status: ON HOLD | COMMUNITY
End: 2024-08-22

## 2024-08-19 RX ORDER — BUPROPION HYDROCHLORIDE 300 MG/1
300 TABLET ORAL EVERY MORNING
COMMUNITY

## 2024-08-19 RX ORDER — TADALAFIL 20 MG/1
20 TABLET ORAL EVERY 24 HOURS
COMMUNITY

## 2024-08-19 RX ORDER — TAMSULOSIN HYDROCHLORIDE 0.4 MG/1
0.4 CAPSULE ORAL DAILY
COMMUNITY

## 2024-08-19 RX ORDER — IRBESARTAN 150 MG/1
150 TABLET ORAL AT BEDTIME
COMMUNITY

## 2024-08-22 ENCOUNTER — ANESTHESIA (OUTPATIENT)
Dept: SURGERY | Facility: CLINIC | Age: 59
End: 2024-08-22
Payer: COMMERCIAL

## 2024-08-22 ENCOUNTER — HOSPITAL ENCOUNTER (OUTPATIENT)
Facility: CLINIC | Age: 59
Discharge: HOME | End: 2024-08-22
Attending: UROLOGY | Admitting: UROLOGY
Payer: COMMERCIAL

## 2024-08-22 ENCOUNTER — ANESTHESIA EVENT (OUTPATIENT)
Dept: SURGERY | Facility: CLINIC | Age: 59
End: 2024-08-22
Payer: COMMERCIAL

## 2024-08-22 VITALS
WEIGHT: 169 LBS | TEMPERATURE: 97.9 F | SYSTOLIC BLOOD PRESSURE: 123 MMHG | BODY MASS INDEX: 28.16 KG/M2 | HEIGHT: 65 IN | RESPIRATION RATE: 13 BRPM | DIASTOLIC BLOOD PRESSURE: 92 MMHG | OXYGEN SATURATION: 94 % | HEART RATE: 88 BPM

## 2024-08-22 DIAGNOSIS — N43.3 HYDROCELE IN ADULT: Primary | ICD-10-CM

## 2024-08-22 PROCEDURE — 250N000011 HC RX IP 250 OP 636: Performed by: ANESTHESIOLOGY

## 2024-08-22 PROCEDURE — 250N000011 HC RX IP 250 OP 636

## 2024-08-22 PROCEDURE — 250N000009 HC RX 250: Performed by: UROLOGY

## 2024-08-22 PROCEDURE — 250N000013 HC RX MED GY IP 250 OP 250 PS 637

## 2024-08-22 PROCEDURE — 272N000001 HC OR GENERAL SUPPLY STERILE: Performed by: UROLOGY

## 2024-08-22 PROCEDURE — 999N000141 HC STATISTIC PRE-PROCEDURE NURSING ASSESSMENT: Performed by: UROLOGY

## 2024-08-22 PROCEDURE — 55040 REMOVAL OF HYDROCELE: CPT | Performed by: STUDENT IN AN ORGANIZED HEALTH CARE EDUCATION/TRAINING PROGRAM

## 2024-08-22 PROCEDURE — 250N000013 HC RX MED GY IP 250 OP 250 PS 637: Performed by: UROLOGY

## 2024-08-22 PROCEDURE — 250N000009 HC RX 250: Performed by: NURSE ANESTHETIST, CERTIFIED REGISTERED

## 2024-08-22 PROCEDURE — 370N000017 HC ANESTHESIA TECHNICAL FEE, PER MIN: Performed by: UROLOGY

## 2024-08-22 PROCEDURE — 710N000012 HC RECOVERY PHASE 2, PER MINUTE: Performed by: UROLOGY

## 2024-08-22 PROCEDURE — 250N000011 HC RX IP 250 OP 636: Performed by: UROLOGY

## 2024-08-22 PROCEDURE — 250N000025 HC SEVOFLURANE, PER MIN: Performed by: UROLOGY

## 2024-08-22 PROCEDURE — 710N000009 HC RECOVERY PHASE 1, LEVEL 1, PER MIN: Performed by: UROLOGY

## 2024-08-22 PROCEDURE — 250N000011 HC RX IP 250 OP 636: Performed by: NURSE ANESTHETIST, CERTIFIED REGISTERED

## 2024-08-22 PROCEDURE — 360N000075 HC SURGERY LEVEL 2, PER MIN: Performed by: UROLOGY

## 2024-08-22 PROCEDURE — 55040 REMOVAL OF HYDROCELE: CPT | Performed by: NURSE ANESTHETIST, CERTIFIED REGISTERED

## 2024-08-22 PROCEDURE — 258N000003 HC RX IP 258 OP 636: Performed by: NURSE ANESTHETIST, CERTIFIED REGISTERED

## 2024-08-22 RX ORDER — FENTANYL CITRATE 0.05 MG/ML
50 INJECTION, SOLUTION INTRAMUSCULAR; INTRAVENOUS EVERY 5 MIN PRN
Status: DISCONTINUED | OUTPATIENT
Start: 2024-08-22 | End: 2024-08-22 | Stop reason: HOSPADM

## 2024-08-22 RX ORDER — ONDANSETRON 2 MG/ML
INJECTION INTRAMUSCULAR; INTRAVENOUS PRN
Status: DISCONTINUED | OUTPATIENT
Start: 2024-08-22 | End: 2024-08-22

## 2024-08-22 RX ORDER — SODIUM CHLORIDE, SODIUM LACTATE, POTASSIUM CHLORIDE, CALCIUM CHLORIDE 600; 310; 30; 20 MG/100ML; MG/100ML; MG/100ML; MG/100ML
INJECTION, SOLUTION INTRAVENOUS CONTINUOUS PRN
Status: DISCONTINUED | OUTPATIENT
Start: 2024-08-22 | End: 2024-08-22

## 2024-08-22 RX ORDER — CEPHALEXIN 500 MG/1
500 CAPSULE ORAL 2 TIMES DAILY
Qty: 14 CAPSULE | Refills: 0 | Status: SHIPPED | OUTPATIENT
Start: 2024-08-22 | End: 2024-08-29

## 2024-08-22 RX ORDER — HYDROMORPHONE HCL IN WATER/PF 6 MG/30 ML
0.2 PATIENT CONTROLLED ANALGESIA SYRINGE INTRAVENOUS EVERY 5 MIN PRN
Status: DISCONTINUED | OUTPATIENT
Start: 2024-08-22 | End: 2024-08-22 | Stop reason: HOSPADM

## 2024-08-22 RX ORDER — SODIUM CHLORIDE, SODIUM LACTATE, POTASSIUM CHLORIDE, CALCIUM CHLORIDE 600; 310; 30; 20 MG/100ML; MG/100ML; MG/100ML; MG/100ML
INJECTION, SOLUTION INTRAVENOUS CONTINUOUS
Status: DISCONTINUED | OUTPATIENT
Start: 2024-08-22 | End: 2024-08-22 | Stop reason: HOSPADM

## 2024-08-22 RX ORDER — CEFAZOLIN SODIUM/WATER 2 G/20 ML
2 SYRINGE (ML) INTRAVENOUS SEE ADMIN INSTRUCTIONS
Status: DISCONTINUED | OUTPATIENT
Start: 2024-08-22 | End: 2024-08-22 | Stop reason: HOSPADM

## 2024-08-22 RX ORDER — ONDANSETRON 2 MG/ML
4 INJECTION INTRAMUSCULAR; INTRAVENOUS EVERY 30 MIN PRN
Status: DISCONTINUED | OUTPATIENT
Start: 2024-08-22 | End: 2024-08-22 | Stop reason: HOSPADM

## 2024-08-22 RX ORDER — BUPIVACAINE HYDROCHLORIDE 5 MG/ML
INJECTION, SOLUTION PERINEURAL PRN
Status: DISCONTINUED | OUTPATIENT
Start: 2024-08-22 | End: 2024-08-22 | Stop reason: HOSPADM

## 2024-08-22 RX ORDER — CEFAZOLIN SODIUM/WATER 2 G/20 ML
2 SYRINGE (ML) INTRAVENOUS
Status: COMPLETED | OUTPATIENT
Start: 2024-08-22 | End: 2024-08-22

## 2024-08-22 RX ORDER — ACETAMINOPHEN 325 MG/1
975 TABLET ORAL ONCE
Status: COMPLETED | OUTPATIENT
Start: 2024-08-22 | End: 2024-08-22

## 2024-08-22 RX ORDER — FENTANYL CITRATE 0.05 MG/ML
25 INJECTION, SOLUTION INTRAMUSCULAR; INTRAVENOUS EVERY 5 MIN PRN
Status: DISCONTINUED | OUTPATIENT
Start: 2024-08-22 | End: 2024-08-22 | Stop reason: HOSPADM

## 2024-08-22 RX ORDER — ACETAMINOPHEN 650 MG/1
650 SUPPOSITORY RECTAL ONCE
Status: COMPLETED | OUTPATIENT
Start: 2024-08-22 | End: 2024-08-22

## 2024-08-22 RX ORDER — DEXAMETHASONE SODIUM PHOSPHATE 4 MG/ML
INJECTION, SOLUTION INTRA-ARTICULAR; INTRALESIONAL; INTRAMUSCULAR; INTRAVENOUS; SOFT TISSUE PRN
Status: DISCONTINUED | OUTPATIENT
Start: 2024-08-22 | End: 2024-08-22

## 2024-08-22 RX ORDER — PROPOFOL 10 MG/ML
INJECTION, EMULSION INTRAVENOUS CONTINUOUS PRN
Status: DISCONTINUED | OUTPATIENT
Start: 2024-08-22 | End: 2024-08-22

## 2024-08-22 RX ORDER — KETAMINE HYDROCHLORIDE 10 MG/ML
INJECTION INTRAMUSCULAR; INTRAVENOUS PRN
Status: DISCONTINUED | OUTPATIENT
Start: 2024-08-22 | End: 2024-08-22

## 2024-08-22 RX ORDER — PROPOFOL 10 MG/ML
INJECTION, EMULSION INTRAVENOUS PRN
Status: DISCONTINUED | OUTPATIENT
Start: 2024-08-22 | End: 2024-08-22

## 2024-08-22 RX ORDER — ONDANSETRON 4 MG/1
4 TABLET, ORALLY DISINTEGRATING ORAL EVERY 30 MIN PRN
Status: DISCONTINUED | OUTPATIENT
Start: 2024-08-22 | End: 2024-08-22 | Stop reason: HOSPADM

## 2024-08-22 RX ORDER — MAGNESIUM HYDROXIDE 1200 MG/15ML
LIQUID ORAL PRN
Status: DISCONTINUED | OUTPATIENT
Start: 2024-08-22 | End: 2024-08-22 | Stop reason: HOSPADM

## 2024-08-22 RX ORDER — DEXAMETHASONE SODIUM PHOSPHATE 4 MG/ML
4 INJECTION, SOLUTION INTRA-ARTICULAR; INTRALESIONAL; INTRAMUSCULAR; INTRAVENOUS; SOFT TISSUE
Status: DISCONTINUED | OUTPATIENT
Start: 2024-08-22 | End: 2024-08-22 | Stop reason: HOSPADM

## 2024-08-22 RX ORDER — TRAMADOL HYDROCHLORIDE 50 MG/1
50 TABLET ORAL EVERY 6 HOURS PRN
Qty: 10 TABLET | Refills: 0 | Status: SHIPPED | OUTPATIENT
Start: 2024-08-22 | End: 2024-08-25

## 2024-08-22 RX ORDER — FENTANYL CITRATE 50 UG/ML
INJECTION, SOLUTION INTRAMUSCULAR; INTRAVENOUS PRN
Status: DISCONTINUED | OUTPATIENT
Start: 2024-08-22 | End: 2024-08-22

## 2024-08-22 RX ORDER — NALOXONE HYDROCHLORIDE 0.4 MG/ML
0.1 INJECTION, SOLUTION INTRAMUSCULAR; INTRAVENOUS; SUBCUTANEOUS
Status: DISCONTINUED | OUTPATIENT
Start: 2024-08-22 | End: 2024-08-22 | Stop reason: HOSPADM

## 2024-08-22 RX ORDER — HYDROMORPHONE HCL IN WATER/PF 6 MG/30 ML
0.4 PATIENT CONTROLLED ANALGESIA SYRINGE INTRAVENOUS EVERY 5 MIN PRN
Status: DISCONTINUED | OUTPATIENT
Start: 2024-08-22 | End: 2024-08-22 | Stop reason: HOSPADM

## 2024-08-22 RX ORDER — LIDOCAINE HYDROCHLORIDE 20 MG/ML
INJECTION, SOLUTION INFILTRATION; PERINEURAL PRN
Status: DISCONTINUED | OUTPATIENT
Start: 2024-08-22 | End: 2024-08-22

## 2024-08-22 RX ORDER — HYDROCODONE BITARTRATE AND ACETAMINOPHEN 5; 325 MG/1; MG/1
1 TABLET ORAL ONCE
Status: COMPLETED | OUTPATIENT
Start: 2024-08-22 | End: 2024-08-22

## 2024-08-22 RX ADMIN — DEXAMETHASONE SODIUM PHOSPHATE 4 MG: 4 INJECTION, SOLUTION INTRA-ARTICULAR; INTRALESIONAL; INTRAMUSCULAR; INTRAVENOUS; SOFT TISSUE at 11:02

## 2024-08-22 RX ADMIN — ONDANSETRON 4 MG: 2 INJECTION INTRAMUSCULAR; INTRAVENOUS at 11:02

## 2024-08-22 RX ADMIN — HYDROMORPHONE HYDROCHLORIDE 0.4 MG: 0.2 INJECTION, SOLUTION INTRAMUSCULAR; INTRAVENOUS; SUBCUTANEOUS at 13:09

## 2024-08-22 RX ADMIN — Medication 2 G: at 10:52

## 2024-08-22 RX ADMIN — PROPOFOL 200 MG: 10 INJECTION, EMULSION INTRAVENOUS at 10:57

## 2024-08-22 RX ADMIN — PHENYLEPHRINE HYDROCHLORIDE 100 MCG: 10 INJECTION INTRAVENOUS at 11:40

## 2024-08-22 RX ADMIN — FENTANYL CITRATE 100 MCG: 50 INJECTION INTRAMUSCULAR; INTRAVENOUS at 10:52

## 2024-08-22 RX ADMIN — SODIUM CHLORIDE, POTASSIUM CHLORIDE, SODIUM LACTATE AND CALCIUM CHLORIDE: 600; 310; 30; 20 INJECTION, SOLUTION INTRAVENOUS at 10:52

## 2024-08-22 RX ADMIN — Medication 50 MG: at 11:06

## 2024-08-22 RX ADMIN — PHENYLEPHRINE HYDROCHLORIDE 100 MCG: 10 INJECTION INTRAVENOUS at 11:30

## 2024-08-22 RX ADMIN — HYDROCODONE BITARTRATE AND ACETAMINOPHEN 1 TABLET: 5; 325 TABLET ORAL at 13:14

## 2024-08-22 RX ADMIN — FENTANYL CITRATE 50 MCG: 50 INJECTION, SOLUTION INTRAMUSCULAR; INTRAVENOUS at 12:27

## 2024-08-22 RX ADMIN — FENTANYL CITRATE 50 MCG: 50 INJECTION, SOLUTION INTRAMUSCULAR; INTRAVENOUS at 12:41

## 2024-08-22 RX ADMIN — PHENYLEPHRINE HYDROCHLORIDE 100 MCG: 10 INJECTION INTRAVENOUS at 11:31

## 2024-08-22 RX ADMIN — ACETAMINOPHEN 975 MG: 325 TABLET, FILM COATED ORAL at 09:47

## 2024-08-22 RX ADMIN — PHENYLEPHRINE HYDROCHLORIDE 100 MCG: 10 INJECTION INTRAVENOUS at 11:41

## 2024-08-22 RX ADMIN — PROPOFOL 50 MCG/KG/MIN: 10 INJECTION, EMULSION INTRAVENOUS at 10:57

## 2024-08-22 RX ADMIN — PROPOFOL 20 MG: 10 INJECTION, EMULSION INTRAVENOUS at 11:25

## 2024-08-22 RX ADMIN — LIDOCAINE HYDROCHLORIDE 100 MG: 20 INJECTION, SOLUTION INFILTRATION; PERINEURAL at 10:57

## 2024-08-22 RX ADMIN — MIDAZOLAM 2 MG: 1 INJECTION INTRAMUSCULAR; INTRAVENOUS at 10:52

## 2024-08-22 ASSESSMENT — ACTIVITIES OF DAILY LIVING (ADL)
ADLS_ACUITY_SCORE: 35

## 2024-08-22 ASSESSMENT — LIFESTYLE VARIABLES: TOBACCO_USE: 0

## 2024-08-22 NOTE — ANESTHESIA CARE TRANSFER NOTE
Patient: Suleman Reece    Procedure: Procedure(s):  left scrotal hydrocelectomy       Diagnosis: Hydrocele, unspecified hydrocele type [N43.3]  Diagnosis Additional Information: No value filed.    Anesthesia Type:   General     Note:    Oropharynx: oropharynx clear of all foreign objects  Level of Consciousness: awake  Oxygen Supplementation: room air    Independent Airway: airway patency satisfactory and stable  Dentition: dentition unchanged  Vital Signs Stable: post-procedure vital signs reviewed and stable  Report to RN Given: handoff report given  Patient transferred to: PACU    Handoff Report: Identifed the Patient, Identified the Reponsible Provider, Reviewed the pertinent medical history, Discussed the surgical course, Reviewed Intra-OP anesthesia mangement and issues during anesthesia, Set expectations for post-procedure period and Allowed opportunity for questions and acknowledgement of understanding      Vitals:  Vitals Value Taken Time   /72 08/22/24 1157   Temp     Pulse 74 08/22/24 1201   Resp 15 08/22/24 1201   SpO2 94 % 08/22/24 1201   Vitals shown include unfiled device data.    Electronically Signed By: RIKY Jon CRNA  August 22, 2024  12:02 PM

## 2024-08-22 NOTE — DISCHARGE INSTRUCTIONS
Same Day Surgery Discharge Instructions for  Sedation and General Anesthesia     It's not unusual to feel dizzy, light-headed or faint for up to 24 hours after surgery or while taking pain medication.  If you have these symptoms: sit for a few minutes before standing and have someone assist you when you get up to walk or use the bathroom.    You should rest and relax for the next 24 hours. We recommend you make arrangements to have an adult stay with you for at least 24 hours after your discharge.  Avoid hazardous and strenuous activity.    DO NOT DRIVE any vehicle or operate mechanical equipment for 24 hours following the end of your surgery.  Even though you may feel normal, your reactions may be affected by the medication you have received.    Do not drink alcoholic beverages for 24 hours following surgery.     Slowly progress to your regular diet as you feel able. It's not unusual to feel nauseated and/or vomit after receiving anesthesia.  If you develop these symptoms, drink clear liquids (apple juice, ginger ale, broth, 7-up, etc. ) until you feel better.  If your nausea and vomiting persists for 24 hours, please notify your surgeon.      All narcotic pain medications, along with inactivity and anesthesia, can cause constipation. Drinking plenty of liquids and increasing fiber intake will help.    For any questions of a medical nature, call your surgeon.    Do not make important decisions for 24 hours.    If you had general anesthesia, you may have a sore throat for a couple of days related to the breathing tube used during surgery.  You may use Cepacol lozenges to help with this discomfort.  If it worsens or if you develop a fever, contact your surgeon.     If you feel your pain is not well managed with the pain medications prescribed by your surgeon, please contact your surgeon's office to let them know so they can address your concerns.     Isma Zavala Drain  Home Care Instructions    What is a Isma  Sally (DAVID) Drain?  This is a small tube that connects to a bulb.  Its gentle suction removes extra fluid from a surgical wound.  Your doctor will remove the tube when the amount of fluid decreases.  The color and amount of fluid varies.  Right after surgery the fluid is bright red.  Over time, it changes to light pink and may become clear or the color of straw.    How should I care for my tube site?  Keep the skin around the tube dry.  Check with your doctor about how to shower.  You may need to cover the site with plastic when you shower.  Or, it may be okay to let the site get wet and put on a clean bandage after you shower.    If the bandage gets wet, you will need to change it.  How should I care for the bulb?  Keep the bulb compressed at all times except while you empty it.   Attach the bulb to your clothing with tape and a safety pin.  Try to empty the bulb at the same time every day.  Empty the bulb at least once a day, or when the bulb becomes half full.  If it becomes too full, there will not be enough suction.    To empty the bulb:  Wash your hands.  Open the bulb cap.  Drain the fluid from the bulb into the measuring cup.  If you have two drains, use two cups.    Clean the mouth of the bulb with an alcohol wipe if your nurse told you to.  Squeeze the bulb (fold it in half before you close the bulb cap) If it does not stay compressed, call your nurse or clinic.  Write the amount of drainage on the drainage record (see back page).  If you have two drains, write the amount for each bulb.  Flush the drainage down the toilet.  Rinse the measuring cup.  Wash your hands.    When should I call my doctor?   Call your doctor if:  You have a fever over 101 F (38.3 C), taken under the tongue.   The drainage increases or smells bad.  The skin around your tube has increased redness, swelling, warmth or pain.  You have pus or fluid leaking at the tube site.  Your stitches break.  You think the tube is not draining.  The  tube falls out.  You have any problems or concerns.    Your drainage record:    Empty your bulb at least once per day or when 1/2 to 1/3 full.  Write down the date, time and amount of drainage in each bulb.   Bring this record to each clinic visit.    Date Time Bulb 1: amount of  Drainage in (ml or cc) Bulb 2: amount of drainage (in ml or cc) Notes                                                        Reasons to contact your surgeon:    Signs of possible infection: Check your incision daily for redness, swelling, warmth, red streaks or foul drainage.   Elevated temperature.  Pain not controlled with pain medication and/or rest.   Uncontrolled nausea or vomiting.  Any questions or concerns.      **If you have questions or concerns about your procedure,  call Dr. Polo at [phone]**

## 2024-08-22 NOTE — ANESTHESIA POSTPROCEDURE EVALUATION
Patient: Suleman Reece    Procedure: Procedure(s):  left scrotal hydrocelectomy       Anesthesia Type:  General    Note:  Disposition: Outpatient   Postop Pain Control: Uneventful            Sign Out: Well controlled pain   PONV: No   Neuro/Psych: Uneventful            Sign Out: Acceptable/Baseline neuro status   Airway/Respiratory: Uneventful            Sign Out: Acceptable/Baseline resp. status   CV/Hemodynamics: Uneventful            Sign Out: Acceptable CV status; No obvious hypovolemia; No obvious fluid overload   Other NRE: NONE   DID A NON-ROUTINE EVENT OCCUR? No           Last vitals:  Vitals Value Taken Time   /79 08/22/24 1300   Temp 36.6  C (97.9  F) 08/22/24 1230   Pulse 86 08/22/24 1312   Resp 15 08/22/24 1312   SpO2 92 % 08/22/24 1312   Vitals shown include unfiled device data.    Electronically Signed By: Megan Velasquez MD  August 22, 2024  1:14 PM

## 2024-08-22 NOTE — OP NOTE
Urology op note  Suleman Reece  MRN [id number]  [facility] [id number]  8/22/2024    Preop diagnosis  Large left hydrocele  Postop diagnosis  The same  Procedure  Left hydrocelectomy  Anesthesia-General  Surgeon-NATASHA  Estimated blood loss-less than 5 ml  Summary of findings-large left hydrocele  Drains-10 [place] DAVID    Brief history and physical patient is a 59-year-old male with a gradual enlargement of his left hemiscrotum.  He has had moderate discomfort.  Scrotal ultrasound confirmed the presence of the large left hydrocele also a smaller right hydrocele.  Because of his continued discomfort and the size of the hydrocele he is now scheduled for definitive treatment.  Procedure explained the patient including possible complications or risk, all questions were answered.    Description of the procedure-proper permits were obtained and signed.  He was taken to the operating room after adequate general anesthesia he was prepped and draped in usual sterile fashion.  Appropriate timeout was taken.  A 3 cm transverse incision was made in the midportion of the left hemiscrotum.  Sharp dissection was used to transect down to the level of the processes vaginalis.  It was rather adherent and freed in a circumferential manner through the small incision.  The processes vaginalis then was opened and drained for approximately 400 cc of straw-colored fluid.  The testicle that was brought through the small incision into the operative field.  The redundant hydrocele sac then was opened, trimmed, and everted with 3-0 Vicryl sutures.  There was no evidence of any bleeding noted.  0.5% Marcaine was injected into the cord and skin edges for postop analgesia.  The testicle then was replaced into the left hemiscrotum in anatomical position.  A 10 [place] round DAVID drain was placed through a separate stab incision excision into the dependent portion of the scrotum.  It was fixed to the skin with 3-0 chromic suture.  The dartos muscle layer was  closed with a running 3-0 chromic suture.  The skin was closed with interrupted 4-0 Vicryl sutures Tegaderm dressings were applied and athletic supporter was placed.  He was taken to recovery in stable condition    His DAVID drain were removed in the office on Friday.    CHUY KAUR M.D.

## 2024-08-22 NOTE — OR NURSING
Patient is adequate for discharge to home from Phase 2. Ox4 and AVSS. Tolerating PO, denies nausea. Pain well controlled. Discharge instructions completed with Mom Vadim. Discharge medications and all belongings returned to patient and sent home.

## 2024-08-22 NOTE — ANESTHESIA PREPROCEDURE EVALUATION
Anesthesia Pre-Procedure Evaluation    Patient: Suleman Reece   MRN: [id number] : 1965        Procedure : Procedure(s):  left scrotal hydrocelectomy possible right scrotal hydrocelectomy          Past Medical History:   Diagnosis Date    Alcohol abuse, in remission     Benign prostatic hyperplasia with nocturia     Bilateral hydrocele     ED (erectile dysfunction)     Exercise-induced asthma     HTN (hypertension)     JUWAN (obstructive sleep apnea)     Difficulty with CPAP use    PTSD (post-traumatic stress disorder)       Past Surgical History:   Procedure Laterality Date    WISDOM TEETH EXTRACTION         Allergies   Allergen Reactions    Cat Hair Extract     Mold     Other Environmental Allergy     Pollen Extract       Social History     Tobacco Use    Smoking status: Never    Smokeless tobacco: Never   Substance Use Topics    Alcohol use: Yes      Wt Readings from Last 1 Encounters:   24 76.7 kg (169 lb)        Anesthesia Evaluation            ROS/MED HX  ENT/Pulmonary:     (+) sleep apnea, doesn't use CPAP,                    asthma               (-) tobacco use   Neurologic:       Cardiovascular:     (+)  hypertension- -   -  - -                                      METS/Exercise Tolerance: >4 METS    Hematologic:       Musculoskeletal:       GI/Hepatic:       Renal/Genitourinary:     (+)        BPH,      Endo:       Psychiatric/Substance Use:     (+) psychiatric history (PTSD)  alcohol abuse (still drinking 1-2 beers per day; taking naltrexone for assistance)      Infectious Disease:       Malignancy:       Other:            Physical Exam    Airway        Mallampati: II   TM distance: > 3 FB   Neck ROM: full   Mouth opening: > 3 cm    Respiratory Devices and Support         Dental  no notable dental history     (+) Completely normal teeth      Cardiovascular          Rhythm and rate: regular and normal     Pulmonary   pulmonary exam normal                OUTSIDE LABS:  CBC:   Lab Results  "  Component Value Date    WBC 6.0 07/31/2019    HGB 13.3 07/31/2019    HCT 39.5 (L) 07/31/2019     07/31/2019     BMP:   Lab Results   Component Value Date     07/31/2019    POTASSIUM 3.7 07/31/2019    CHLORIDE 102 07/31/2019    CO2 25 07/31/2019    BUN 12 07/31/2019    CR 0.70 07/31/2019    GLC 88 07/31/2019     COAGS: No results found for: \"PTT\", \"INR\", \"FIBR\"  POC: No results found for: \"BGM\", \"HCG\", \"HCGS\"  HEPATIC:   Lab Results   Component Value Date    ALBUMIN 4.2 07/31/2019    PROTTOTAL 7.6 07/31/2019     (H) 07/31/2019    AST 88 (H) 07/31/2019    ALKPHOS 41 07/31/2019    BILITOTAL 0.3 07/31/2019     OTHER:   Lab Results   Component Value Date    ROD 8.9 07/31/2019       Anesthesia Plan    ASA Status:  2    NPO Status:  NPO Appropriate    Anesthesia Type: General.     - Airway: LMA   Induction: Intravenous, Propofol.   Maintenance: Balanced.        Consents    Anesthesia Plan(s) and associated risks, benefits, and realistic alternatives discussed. Questions answered and patient/representative(s) expressed understanding.     - Discussed:     - Discussed with:  Patient            Postoperative Care    Pain management: IV analgesics, Oral pain medications.   PONV prophylaxis: Ondansetron (or other 5HT-3), Dexamethasone or Solumedrol     Comments:               Gregory Bates MD    I have reviewed the pertinent notes and labs in the chart from the past 30 days and (re)examined the patient.  Any updates or changes from those notes are reflected in this note.              # Overweight: Estimated body mass index is 28.12 kg/m  as calculated from the following:    Height as of this encounter: 1.651 m (5' 5\").    Weight as of this encounter: 76.7 kg (169 lb).      "

## 2024-08-22 NOTE — BRIEF OP NOTE
Suleman Reece  MRN [id number]  [facility] [id number]  8/22/2024    Brief op note    Preop diagnosis-large left hydrocele  Postop diagnosis the same  Procedure-left hydrocelectomy  Anesthesia-General  Surgeon-isaias  Estimated blood loss less than 5 ml  Summary of findings-large left hydrocele  Drains-DAVID 10 [address]  Right hydrocele with small and not treated

## 2025-02-23 ENCOUNTER — HEALTH MAINTENANCE LETTER (OUTPATIENT)
Age: 60
End: 2025-02-23

## (undated) DEVICE — DRSG TEGADERM 2 3/8X2 3/4" 1624W

## (undated) DEVICE — LINEN TOWEL PACK X5 5464

## (undated) DEVICE — SU CHROMIC 4-0 SH 27" G121H

## (undated) DEVICE — PIN SAFTY INF 1.5" STERILE SS C18700-020

## (undated) DEVICE — SOL NACL 0.9% IRRIG 1000ML BOTTLE 2F7124

## (undated) DEVICE — DRAIN JACKSON PRATT RESERVOIR 100ML SU130-1305

## (undated) DEVICE — SUPPORTER ATHLETIC LG LATEX 202636

## (undated) DEVICE — PAD CHUX UNDERPAD 23X24" 7136

## (undated) DEVICE — SU CHROMIC 3-0 SH 27" G122H

## (undated) DEVICE — PREP SKIN SCRUB TRAY 4461A

## (undated) DEVICE — DRAPE MINOR PROCEDURE LAP 29496

## (undated) DEVICE — GLOVE BIOGEL PI MICRO SZ 7.5 48575

## (undated) DEVICE — DRAIN PENROSE 0.25"X18" LATEX FREE GR201

## (undated) DEVICE — BLADE CLIPPER 4406

## (undated) DEVICE — SOL WATER IRRIG 1000ML BOTTLE 2F7114

## (undated) DEVICE — DECANTER BAG 2002S

## (undated) DEVICE — ESU PENCIL W/SMOKE EVAC NEPTUNE STRYKER 0703-046-000

## (undated) DEVICE — PACK MINOR SBA15MIFSE

## (undated) DEVICE — SU VICRYL 4-0 SH-1 27" J315H

## (undated) DEVICE — DRAIN JACKSON PRATT 10FR ROUND SU130-1321

## (undated) RX ORDER — DEXAMETHASONE SODIUM PHOSPHATE 4 MG/ML
INJECTION, SOLUTION INTRA-ARTICULAR; INTRALESIONAL; INTRAMUSCULAR; INTRAVENOUS; SOFT TISSUE
Status: DISPENSED
Start: 2024-08-22

## (undated) RX ORDER — ONDANSETRON 2 MG/ML
INJECTION INTRAMUSCULAR; INTRAVENOUS
Status: DISPENSED
Start: 2024-08-22

## (undated) RX ORDER — PROPOFOL 10 MG/ML
INJECTION, EMULSION INTRAVENOUS
Status: DISPENSED
Start: 2024-08-22

## (undated) RX ORDER — BUPIVACAINE HYDROCHLORIDE 5 MG/ML
INJECTION, SOLUTION EPIDURAL; INTRACAUDAL
Status: DISPENSED
Start: 2024-08-22

## (undated) RX ORDER — ACETAMINOPHEN 325 MG/1
TABLET ORAL
Status: DISPENSED
Start: 2024-08-22

## (undated) RX ORDER — HYDROCODONE BITARTRATE AND ACETAMINOPHEN 5; 325 MG/1; MG/1
TABLET ORAL
Status: DISPENSED
Start: 2024-08-22

## (undated) RX ORDER — HYDROMORPHONE HCL IN WATER/PF 6 MG/30 ML
PATIENT CONTROLLED ANALGESIA SYRINGE INTRAVENOUS
Status: DISPENSED
Start: 2024-08-22

## (undated) RX ORDER — FENTANYL CITRATE 0.05 MG/ML
INJECTION, SOLUTION INTRAMUSCULAR; INTRAVENOUS
Status: DISPENSED
Start: 2024-08-22

## (undated) RX ORDER — FENTANYL CITRATE 50 UG/ML
INJECTION, SOLUTION INTRAMUSCULAR; INTRAVENOUS
Status: DISPENSED
Start: 2024-08-22